# Patient Record
Sex: FEMALE | Race: WHITE | NOT HISPANIC OR LATINO | Employment: OTHER | URBAN - METROPOLITAN AREA
[De-identification: names, ages, dates, MRNs, and addresses within clinical notes are randomized per-mention and may not be internally consistent; named-entity substitution may affect disease eponyms.]

---

## 2018-02-05 ENCOUNTER — HOSPITAL ENCOUNTER (OUTPATIENT)
Dept: MAMMOGRAPHY | Facility: CLINIC | Age: 68
Discharge: HOME | End: 2018-02-05
Attending: INTERNAL MEDICINE

## 2018-02-05 DIAGNOSIS — Z12.31 VISIT FOR SCREENING MAMMOGRAM: ICD-10-CM

## 2018-06-28 ENCOUNTER — RECORDS - HEALTHEAST (OUTPATIENT)
Dept: ADMINISTRATIVE | Facility: OTHER | Age: 68
End: 2018-06-28

## 2019-04-22 ENCOUNTER — COMMUNICATION - HEALTHEAST (OUTPATIENT)
Dept: INTERNAL MEDICINE | Facility: CLINIC | Age: 69
End: 2019-04-22

## 2019-05-29 ENCOUNTER — HOSPITAL ENCOUNTER (OUTPATIENT)
Dept: MAMMOGRAPHY | Facility: CLINIC | Age: 69
Discharge: HOME | End: 2019-05-29
Attending: INTERNAL MEDICINE

## 2019-05-29 DIAGNOSIS — Z12.31 VISIT FOR SCREENING MAMMOGRAM: ICD-10-CM

## 2019-09-09 ENCOUNTER — RECORDS - HEALTHEAST (OUTPATIENT)
Dept: ADMINISTRATIVE | Facility: OTHER | Age: 69
End: 2019-09-09

## 2019-09-10 ENCOUNTER — RECORDS - HEALTHEAST (OUTPATIENT)
Dept: ADMINISTRATIVE | Facility: OTHER | Age: 69
End: 2019-09-10

## 2020-04-30 ENCOUNTER — OFFICE VISIT - HEALTHEAST (OUTPATIENT)
Dept: INTERNAL MEDICINE | Facility: CLINIC | Age: 70
End: 2020-04-30

## 2020-04-30 DIAGNOSIS — Z90.5 SOLITARY KIDNEY, ACQUIRED: ICD-10-CM

## 2020-04-30 DIAGNOSIS — M85.89 OSTEOPENIA OF MULTIPLE SITES: ICD-10-CM

## 2020-04-30 DIAGNOSIS — N28.9 RENAL INSUFFICIENCY: ICD-10-CM

## 2020-04-30 DIAGNOSIS — Z80.3 FAMILY HISTORY OF MALIGNANT NEOPLASM OF BREAST: ICD-10-CM

## 2020-04-30 DIAGNOSIS — R73.03 PREDIABETES: ICD-10-CM

## 2020-04-30 RX ORDER — LORATADINE 10 MG/1
10 TABLET ORAL DAILY PRN
Status: SHIPPED | COMMUNITY
Start: 2020-04-30

## 2020-04-30 ASSESSMENT — PATIENT HEALTH QUESTIONNAIRE - PHQ9: SUM OF ALL RESPONSES TO PHQ QUESTIONS 1-9: 0

## 2020-06-15 ENCOUNTER — RECORDS - HEALTHEAST (OUTPATIENT)
Dept: ADMINISTRATIVE | Facility: OTHER | Age: 70
End: 2020-06-15

## 2020-07-16 ENCOUNTER — RECORDS - HEALTHEAST (OUTPATIENT)
Dept: ADMINISTRATIVE | Facility: OTHER | Age: 70
End: 2020-07-16

## 2020-07-22 ENCOUNTER — HOSPITAL ENCOUNTER (OUTPATIENT)
Dept: MAMMOGRAPHY | Facility: CLINIC | Age: 70
Discharge: HOME | End: 2020-07-22
Attending: INTERNAL MEDICINE

## 2020-07-22 DIAGNOSIS — Z12.31 VISIT FOR SCREENING MAMMOGRAM: ICD-10-CM

## 2020-09-16 ENCOUNTER — OFFICE VISIT - HEALTHEAST (OUTPATIENT)
Dept: INTERNAL MEDICINE | Facility: CLINIC | Age: 70
End: 2020-09-16

## 2020-09-16 ENCOUNTER — COMMUNICATION - HEALTHEAST (OUTPATIENT)
Dept: INTERNAL MEDICINE | Facility: CLINIC | Age: 70
End: 2020-09-16

## 2020-09-16 DIAGNOSIS — M85.89 OSTEOPENIA OF MULTIPLE SITES: ICD-10-CM

## 2020-09-16 DIAGNOSIS — Z00.00 ROUTINE GENERAL MEDICAL EXAMINATION AT A HEALTH CARE FACILITY: ICD-10-CM

## 2020-09-16 DIAGNOSIS — R73.03 PREDIABETES: ICD-10-CM

## 2020-09-16 DIAGNOSIS — N28.9 RENAL INSUFFICIENCY: ICD-10-CM

## 2020-09-16 DIAGNOSIS — Z23 NEED FOR IMMUNIZATION AGAINST INFLUENZA: ICD-10-CM

## 2020-09-16 DIAGNOSIS — Z23 NEED FOR VACCINATION FOR PNEUMOCOCCUS: ICD-10-CM

## 2020-09-16 DIAGNOSIS — Z80.3 FAMILY HISTORY OF MALIGNANT NEOPLASM OF BREAST: ICD-10-CM

## 2020-09-16 DIAGNOSIS — R93.89 ABNORMAL FINDINGS ON DIAGNOSTIC IMAGING OF OTHER SPECIFIED BODY STRUCTURES: ICD-10-CM

## 2020-09-16 DIAGNOSIS — Z90.5 SOLITARY KIDNEY, ACQUIRED: ICD-10-CM

## 2020-09-16 LAB
ALBUMIN SERPL-MCNC: 4.4 G/DL (ref 3.5–5)
ALP SERPL-CCNC: 67 U/L (ref 45–120)
ALT SERPL W P-5'-P-CCNC: 22 U/L (ref 0–45)
ANION GAP SERPL CALCULATED.3IONS-SCNC: 9 MMOL/L (ref 5–18)
AST SERPL W P-5'-P-CCNC: 23 U/L (ref 0–40)
BILIRUB SERPL-MCNC: 0.7 MG/DL (ref 0–1)
BUN SERPL-MCNC: 17 MG/DL (ref 8–22)
CALCIUM SERPL-MCNC: 10.4 MG/DL (ref 8.5–10.5)
CHLORIDE BLD-SCNC: 108 MMOL/L (ref 98–107)
CHOLEST SERPL-MCNC: 234 MG/DL
CO2 SERPL-SCNC: 27 MMOL/L (ref 22–31)
CREAT SERPL-MCNC: 1.03 MG/DL (ref 0.6–1.1)
ERYTHROCYTE [DISTWIDTH] IN BLOOD BY AUTOMATED COUNT: 12.1 % (ref 11–14.5)
FASTING STATUS PATIENT QL REPORTED: YES
GFR SERPL CREATININE-BSD FRML MDRD: 53 ML/MIN/1.73M2
GLUCOSE BLD-MCNC: 93 MG/DL (ref 70–125)
HBA1C MFR BLD: 5.7 %
HCT VFR BLD AUTO: 44.3 % (ref 35–47)
HDLC SERPL-MCNC: 69 MG/DL
HGB BLD-MCNC: 14.6 G/DL (ref 12–16)
LDLC SERPL CALC-MCNC: 150 MG/DL
MCH RBC QN AUTO: 30.3 PG (ref 27–34)
MCHC RBC AUTO-ENTMCNC: 33 G/DL (ref 32–36)
MCV RBC AUTO: 92 FL (ref 80–100)
PLATELET # BLD AUTO: 147 THOU/UL (ref 140–440)
PMV BLD AUTO: 8.7 FL (ref 7–10)
POTASSIUM BLD-SCNC: 4.5 MMOL/L (ref 3.5–5)
PROT SERPL-MCNC: 7.3 G/DL (ref 6–8)
RBC # BLD AUTO: 4.83 MILL/UL (ref 3.8–5.4)
SODIUM SERPL-SCNC: 144 MMOL/L (ref 136–145)
TRIGL SERPL-MCNC: 73 MG/DL
TSH SERPL DL<=0.005 MIU/L-ACNC: 2.24 UIU/ML (ref 0.3–5)
WBC: 5.1 THOU/UL (ref 4–11)

## 2020-09-16 ASSESSMENT — PATIENT HEALTH QUESTIONNAIRE - PHQ9: SUM OF ALL RESPONSES TO PHQ QUESTIONS 1-9: 0

## 2020-09-16 ASSESSMENT — MIFFLIN-ST. JEOR: SCORE: 1125.64

## 2020-09-28 ENCOUNTER — COMMUNICATION - HEALTHEAST (OUTPATIENT)
Dept: INTERNAL MEDICINE | Facility: CLINIC | Age: 70
End: 2020-09-28

## 2020-09-28 DIAGNOSIS — Z20.822 ENCOUNTER FOR LABORATORY TESTING FOR COVID-19 VIRUS: ICD-10-CM

## 2020-09-29 ENCOUNTER — RECORDS - HEALTHEAST (OUTPATIENT)
Dept: BONE DENSITY | Facility: CLINIC | Age: 70
End: 2020-09-29

## 2020-09-29 ENCOUNTER — COMMUNICATION - HEALTHEAST (OUTPATIENT)
Dept: INTERNAL MEDICINE | Facility: CLINIC | Age: 70
End: 2020-09-29

## 2020-09-29 ENCOUNTER — RECORDS - HEALTHEAST (OUTPATIENT)
Dept: ADMINISTRATIVE | Facility: OTHER | Age: 70
End: 2020-09-29

## 2020-09-29 DIAGNOSIS — M85.89 OTHER SPECIFIED DISORDERS OF BONE DENSITY AND STRUCTURE, MULTIPLE SITES: ICD-10-CM

## 2020-09-29 DIAGNOSIS — Z20.822 ENCOUNTER FOR LABORATORY TESTING FOR COVID-19 VIRUS: ICD-10-CM

## 2020-10-07 ENCOUNTER — COMMUNICATION - HEALTHEAST (OUTPATIENT)
Dept: INTERNAL MEDICINE | Facility: CLINIC | Age: 70
End: 2020-10-07

## 2020-10-07 ENCOUNTER — RECORDS - HEALTHEAST (OUTPATIENT)
Dept: ADMINISTRATIVE | Facility: OTHER | Age: 70
End: 2020-10-07

## 2021-04-22 ENCOUNTER — OFFICE VISIT - HEALTHEAST (OUTPATIENT)
Dept: INTERNAL MEDICINE | Facility: CLINIC | Age: 71
End: 2021-04-22

## 2021-04-22 DIAGNOSIS — Z90.5 SOLITARY KIDNEY, ACQUIRED: ICD-10-CM

## 2021-04-22 DIAGNOSIS — N28.9 RENAL INSUFFICIENCY: ICD-10-CM

## 2021-04-22 DIAGNOSIS — M85.89 OSTEOPENIA OF MULTIPLE SITES: ICD-10-CM

## 2021-04-22 DIAGNOSIS — R73.03 PREDIABETES: ICD-10-CM

## 2021-04-22 RX ORDER — MELATONIN 10 MG
1 CAPSULE ORAL
Status: ON HOLD | COMMUNITY
Start: 2021-04-22 | End: 2022-12-28

## 2021-05-18 ENCOUNTER — RECORDS - HEALTHEAST (OUTPATIENT)
Dept: ADMINISTRATIVE | Facility: OTHER | Age: 71
End: 2021-05-18

## 2021-05-22 ENCOUNTER — HEALTH MAINTENANCE LETTER (OUTPATIENT)
Age: 71
End: 2021-05-22

## 2021-05-26 ENCOUNTER — RECORDS - HEALTHEAST (OUTPATIENT)
Dept: ADMINISTRATIVE | Facility: CLINIC | Age: 71
End: 2021-05-26

## 2021-05-27 ENCOUNTER — RECORDS - HEALTHEAST (OUTPATIENT)
Dept: ADMINISTRATIVE | Facility: CLINIC | Age: 71
End: 2021-05-27

## 2021-05-27 ASSESSMENT — PATIENT HEALTH QUESTIONNAIRE - PHQ9
SUM OF ALL RESPONSES TO PHQ QUESTIONS 1-9: 0
SUM OF ALL RESPONSES TO PHQ QUESTIONS 1-9: 0

## 2021-05-28 ENCOUNTER — RECORDS - HEALTHEAST (OUTPATIENT)
Dept: ADMINISTRATIVE | Facility: CLINIC | Age: 71
End: 2021-05-28

## 2021-05-29 ENCOUNTER — RECORDS - HEALTHEAST (OUTPATIENT)
Dept: ADMINISTRATIVE | Facility: CLINIC | Age: 71
End: 2021-05-29

## 2021-05-30 ENCOUNTER — RECORDS - HEALTHEAST (OUTPATIENT)
Dept: ADMINISTRATIVE | Facility: CLINIC | Age: 71
End: 2021-05-30

## 2021-06-02 ENCOUNTER — RECORDS - HEALTHEAST (OUTPATIENT)
Dept: ADMINISTRATIVE | Facility: CLINIC | Age: 71
End: 2021-06-02

## 2021-06-05 VITALS
HEIGHT: 65 IN | WEIGHT: 134.2 LBS | TEMPERATURE: 97.8 F | HEART RATE: 68 BPM | DIASTOLIC BLOOD PRESSURE: 82 MMHG | OXYGEN SATURATION: 97 % | BODY MASS INDEX: 22.36 KG/M2 | SYSTOLIC BLOOD PRESSURE: 120 MMHG

## 2021-06-07 NOTE — PATIENT INSTRUCTIONS - HE
Generally quite healthy, active 70-year-old woman establishing primary care with me.  Issues are    Solitary right kidney since age 46 (she was a living related kidney donor), mild renal insufficiency with creatinine 1.18 measured September 2018, historically good blood pressures    Mildly elevated LDL cholesterol of 135, but has generous levels of HDL cholesterol at 74; I do not consider as having lipid disorder    Borderline elevated hemoglobin A1c of 5.7, possibly indicating prediabetes, when checked at [facility] in 2018.  When she comes in for face-to-face visit this autumn, lets include A1c with her lab work.    Osteopenia by DEXA scan done at [facility] September 19, 2018 with total lumbar T score -1.0 and total hip T score -1.1.  Was on Fosamax for about 2 years about 20 years ago (around age 51).  I recommended that this autumn 2020, when I meet her face-to-face, that would be a good time to get another bone mineral density measurement.  In the meantime, continue on the calcium and vitamin D supplements that she already takes.  She already gets good weightbearing exercise.    Family history of breast cancer (sister), most recent mammogram with tomosynthesis was done May 29, 2019, so she should get her next one done sometime in the month of May or June 2020.    Satisfactory screening colonoscopy April 2016, good for 10 years, could be rechecked in 2026.    Seasonal nasal allergies, for which he takes over-the-counter loratadine.

## 2021-06-07 NOTE — PROGRESS NOTES
"Caitlin Mohan is a 70 y.o. female who is being evaluated via a billable video visit.      The patient has been notified of following:     \"This video visit will be conducted via a call between you and your physician/provider. We have found that certain health care needs can be provided without the need for an in-person physical exam.  This service lets us provide the care you need with a video conversation.  If a prescription is necessary we can send it directly to your pharmacy.  If lab work is needed we can place an order for that and you can then stop by our lab to have the test done at a later time.    Video visits are billed at different rates depending on your insurance coverage. Please reach out to your insurance provider with any questions.    If during the course of the call the physician/provider feels a video visit is not appropriate, you will not be charged for this service.\"    Patient has given verbal consent to a Video visit? Yes    Patient would like to receive their AVS by AVS Preference: Quinton.    Patient would like the video invitation sent by: Send to e-mail at: [email or web address]    Will anyone else be joining your video visit? No        Video Start Time: 9:37    Video visit today to establish primary care with me.  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  Historically she is been very healthy.  She told me that she generally feels well, and is on no prescription medication.    She goes for daily walks with her  around the neighborhood, while they take precautions against COVID-19.    At age 46 donated her left kidney to sister (who had DM 1), Also half pancreas    Executive Health Exam [place] [facility]-    [place]  Calculated LDL   mg/dL  135High       Cholesterol, HDL, S  >=50 mg/dL  74      Hemoglobin A1c, B  4.0 - 5.6 %  5.7High        9/19/18 0711     Creatinine, S  0.59 - 1.04 mg/dL  1.18High       Sept 19, 2018  Femur Neck: BMD =  0.864 g/cm (sq)      T-score = " -1.3      Z-score =  0.3        Total Hip: BMD =  0.873 g/cm (sq)      T-score = -1.1      Z-score =  0.3    Lumbar Spine  [single scan]:      L1: BMD = 1.027 g/cm (sq), T-score =-0.9, Z-score = 0.7      L2: BMD = 1.026 g/cm (sq), T-score =-1.5, Z-score = 0.1      L3: BMD = 1.120 g/cm (sq), T-score =-0.8, Z-score = 0.9      L4: BMD = 1.099 g/cm (sq), T-score =-0.9, Z-score = 0.7        Total Lumbar Spine: BMD =  1.071 g/cm (sq)      T-score = -1.0      Z-score =  0.6  IMPRESSION: Osteopenia   Was on Fosamax about 20 yrs ago for about 2 years    Weight: 132#  Height 5 foot 5    BP Readings from Last 3 Encounters:   11/25/15 124/66     Family history breast CA  Get tomosynthesis  BILATERAL FULL FIELD DIGITAL SCREENING MAMMOGRAM WITH TOMOSYNTHESIS     Performed on: 5/29/19.     Colonoscopy: age 66, 4-, normal 10 years    During today's video interview, she appeared well, breathing comfortably, speech fluent, facial movement symmetrical.  Her movements around the room looked crisp and well balanced.        ASSESSMENT AND PLAN    Generally quite healthy, active 70-year-old woman establishing primary care with me.  Issues are    Solitary right kidney since age 46 (she was a living related kidney donor), mild renal insufficiency with creatinine 1.18 measured September 2018, historically good blood pressures    Mildly elevated LDL cholesterol of 135, but has generous levels of HDL cholesterol at 74; I do not consider as having lipid disorder    Borderline elevated hemoglobin A1c of 5.7, possibly indicating prediabetes, when checked at [facility] in 2018.  When she comes in for face-to-face visit this autumn, lets include A1c with her lab work.    Osteopenia by DEXA scan done at [facility] September 19, 2018 with total lumbar T score -1.0 and total hip T score -1.1.  Was on Fosamax for about 2 years about 20 years ago (around age 51).  I recommended that this autumn 2020, when I meet her face-to-face, that would be  a good time to get another bone mineral density measurement.  In the meantime, continue on the calcium and vitamin D supplements that she already takes.  She already gets good weightbearing exercise.    Family history of breast cancer (sister), most recent mammogram with tomosynthesis was done May 29, 2019, so she should get her next one done sometime in the month of May or June 2020.    Satisfactory screening colonoscopy April 2016, good for 10 years, could be rechecked in 2026.    Seasonal nasal allergies, for which he takes over-the-counter loratadine.      Video-Visit Details    Type of service:  Video Visit    Video End Time (time video stopped): 9:51 AM  Originating Location (pt. Location): Home    Distant Location (provider location):  [facility] INTERNAL MEDICINE     Platform used for Video Visit: Haileo    I spent 15 minutes video time with the patient, with > 50% counseling, explaining and discussing with the patient the issues enumerated in the Assessment and Plan section of this note and answering questions. Afterwards, the patient was given a printout of the AVS,  [employer]      Juan Khanna MD

## 2021-06-11 NOTE — PROGRESS NOTES
Assessment and Plan:     Annual wellness visit for Ms. Campbell who is 70 years old this year, and has been doing well since our last visit in April 2020, but then discovered she had fractured her left ankle when she sought medical attention in June 2020.    Broke left ankle April 17, 2020 on a walk, stepped off trail, rolled ankle  Saw medical attention 2 months later, xray confirmed fracture, which was healing, no boot, no case.  On physical exam today, the ankle is not swollen, but there is a soft tissue lump about 1 cm on the medial aspect of the ankle that I think is a ganglion cyst arising from tendon.  I do not think he has anything to do with the fracture.  She seems to have recovered pretty well.  I reminded her of the importance of supportive footwear when she is hiking in the great outdoors.    Solitary right kidney since age 46 (she was a living related kidney donor), mild renal insufficiency with creatinine 1.18 measured September 2018, historically good blood pressures  Will check comprehensive metabolic panel today September 16 to measure her kidney function markers BUN and creatinine     Mildly elevated LDL cholesterol of 135, but has generous levels of HDL cholesterol at 74; I do not consider as having lipid disorder  Check lipid panel today September 16     Borderline elevated hemoglobin A1c of 5.7, possibly indicating prediabetes, when checked at [facility] in 2018.    Check A1c today September 16    Osteopenia by DEXA scan done at [facility] September 19, 2018 with total lumbar T score -1.0 and total hip T score -1.1.  Was on Fosamax for about 2 years about 20 years ago (around age 51).   Will get DEXA  Continue on the calcium and vitamin D supplements that she already takes.  She already gets good weightbearing exercise.     Family history of breast cancer (sister)  BILATERAL FULL FIELD DIGITAL SCREENING MAMMOGRAM WITH TOMOSYNTHESIS  Performed on: 7/22/20.     Satisfactory screening  colonoscopy April 2016, good for 10 years, could be rechecked in 2026.    Seasonal nasal allergies, for which he takes over-the-counter loratadine.    Loose stool once a day, likely represents irritable bowel syndrome.  She told me that she will have a large loose sometimes liquid bowel movement soon after eating breakfast.  But during the rest of the day she feels okay.  She told me that she does consume a lot of coffee, and her colon may be sensitive to the caffeine.  Might be worth an experiment to go decaffeinated to see if it makes a difference.    Today we will administer seasonal influenza vaccine double strength, also pneumococcal polysaccharide 23.  She has received the live shingles vaccine, but I told her she could consider getting the recombinant shingles vaccine, called Shingrix, which is 2 injections 2 or 3 months apart, and would be administered at a pharmacy under her Medicare prescription drug benefit.    The patient's current medical problems were reviewed.    I have had an Advance Directives discussion with the patient.  The following health maintenance schedule was reviewed with the patient and provided in printed form in the after visit summary:   Health Maintenance   Topic Date Due     HEPATITIS C SCREENING  1950     ZOSTER VACCINES (2 of 3) 02/03/2014     PNEUMOCOCCAL IMMUNIZATION 65+ LOW/MEDIUM RISK (1 of 2 - PCV13) 02/04/2016     DXA SCAN  02/04/2016     MEDICARE ANNUAL WELLNESS VISIT  11/25/2016     INFLUENZA VACCINE RULE BASED (1) 08/01/2020     ADVANCE CARE PLANNING  11/25/2020     LIPID  11/25/2020     FALL RISK ASSESSMENT  04/30/2021     MAMMOGRAM  07/22/2022     TD 18+ HE  04/12/2023     COLORECTAL CANCER SCREENING  04/13/2026     HEPATITIS B VACCINES  Aged Out        Subjective:   Chief Complaint: Caitlin Mohan is an 70 y.o. female here for an Annual Wellness visit.   HPI:     Annual wellness visit for Ms. Campbell who is 70 years old this year, and has been doing well  since our last visit in April 2020, but then discovered she had fractured her left ankle when she sought medical attention in June 2020.    Broke left ankle April 17, 2020 on a walk, stepped off trail, rolled ankle  Saw medical attention 2 months later, xray confirmed fracture, which was healing, no boot, no case.  On physical exam today, the ankle is not swollen, but there is a soft tissue lump about 1 cm on the medial aspect of the ankle that I think is a ganglion cyst arising from tendon.  I do not think he has anything to do with the fracture.  She seems to have recovered pretty well.  I reminded her of the importance of supportive footwear when she is hiking in the great outdoors.        Immunization History   Administered Date(s) Administered     Influenza high dose,seasonal,PF, 65+ yrs 11/19/2019     Influenza, inj, historic,unspecified 11/19/2019     Pneumo Conj 13-V (2010&after) 09/28/2018     Tdap 04/12/2013     ZOSTER, LIVE 12/09/2013       Review of Systems:Please see above.  The rest of the review of systems are negative for all systems.    Patient Care Team:  Juan Khanna MD as PCP - General (Internal Medicine)     Patient Active Problem List   Diagnosis     Osteopenia of multiple sites     Solitary kidney, acquired     Renal insufficiency     Family history of malignant neoplasm of breast (sister)     Prediabetes     Past Medical History:   Diagnosis Date     Breast cyst 1978     H/O kidney donation 1996    LEFT     History of basal cell cancer     NARES     Osteopenia     -1.1     Postmenopausal     AGE 51      Past Surgical History:   Procedure Laterality Date     APPENDECTOMY       ARTHROSCOPIC REPAIR ACL       CATARACT EXTRACTION, BILATERAL       KNEE ARTHROSCOPY  1978     KNEE ARTHROSCOPY       LAPAROSCOPIC DONOR NEPHRECTOMY      Description: Pancreatic Transplantation Donor Pancreatectomy;  Recorded: 04/04/2008;      Family History   Problem Relation Age of Onset     Breast cancer Sister  47     Brain cancer Nephew      Diabetes Other         FAMILY     Cancer Sister         BREAST     Cancer Sister      Diabetes Sister       Social History     Socioeconomic History     Marital status:      Spouse name: Not on file     Number of children: Not on file     Years of education: Not on file     Highest education level: Not on file   Occupational History     Not on file   Social Needs     Financial resource strain: Not on file     Food insecurity     Worry: Not on file     Inability: Not on file     Transportation needs     Medical: Not on file     Non-medical: Not on file   Tobacco Use     Smoking status: Never Smoker     Smokeless tobacco: Never Used   Substance and Sexual Activity     Alcohol use: Yes     Comment: BEER     Drug use: Not on file     Sexual activity: Not on file   Lifestyle     Physical activity     Days per week: Not on file     Minutes per session: Not on file     Stress: Not on file   Relationships     Social connections     Talks on phone: Not on file     Gets together: Not on file     Attends [employer] service: Not on file     Active member of club or organization: Not on file     Attends meetings of clubs or organizations: Not on file     Relationship status: Not on file     Intimate partner violence     Fear of current or ex partner: Not on file     Emotionally abused: Not on file     Physically abused: Not on file     Forced sexual activity: Not on file   Other Topics Concern     Not on file   Social History Narrative    APOLONIA MARIO    [employer] [address] [place] [address]-[facility]                  Current Outpatient Medications   Medication Sig Dispense Refill     aspirin 81 MG EC tablet Take 81 mg by mouth daily.       calcium carbonate (OS-MAGDALENA) 600 mg (1,500 mg) tablet Take 600 mg by mouth daily.       cholecalciferol, vitamin D3, 1,000 unit tablet Take 1,000 Units by  "mouth daily.       loratadine (CLARITIN) 10 mg tablet Take 10 mg by mouth daily.       lysine 500 mg Tab Take 500 mg by mouth daily.       omega 3-dha-epa-fish oil 500-1,000 mg cap Take 1,000 mg by mouth.       No current facility-administered medications for this visit.       Objective:   Vital Signs:   Visit Vitals  /82 (Patient Site: Left Arm, Patient Position: Sitting, Cuff Size: Adult Regular)   Pulse 68   Temp 97.8  F (36.6  C) (Oral)   Ht 5' 4.75\" (1.645 m)   Wt 134 lb 3.2 oz (60.9 kg)   SpO2 97%   BMI 22.50 kg/m           VisionScreening:  No exam data present     PHYSICAL EXAM  General: Alert, in no distress  Skin: No significant lesion seen.  Eyes/nose/throat: Eyes without scleral icterus, eye movements normal, pupils equal and reactive, oropharynx clear, ears with normal TM's  MSK: Neck with good ROM  Lymphatic: Neck without adenopathy or masses  Endocrine: Thyroid with no nodules to palpation  Pulm: Lungs clear to auscultation bilaterally  Cardiac: Heart with regular rate and rhythm, no murmur or gallop  GI: Abdomen soft, nontender. No palpable enlargement of liver or spleen  MSK: Extremities no tenderness or edema  + Medial aspect left ankle, superior to the medial malleolus there is a soft tissue lump about 1 cm that has the consistency of a ganglion cyst  Neuro: Moves all extremities, without focal weakness  Psych: Alert, normal mental status. Normal affect and speech      Assessment Results 9/16/2020   Activities of Daily Living No help needed   Instrumental Activities of Daily Living No help needed   Mini Cog Total Score 5   Some recent data might be hidden     A Mini-Cog score of 0-2 suggests the possibility of dementia, score of 3-5 suggests no dementia      Identified Health Risks:     She is at risk for lack of exercise and has been provided with information to increase physical activity for the benefit of her well-being.  The patient reports that she drinks more than one alcoholic drink " per day but denies binge or excessive drinking. She was counseled and given information about possible harmful effects of excessive alcohol intake.  The patient was provided with written information regarding signs of hearing loss.  Information on urinary incontinence and treatment options given to patient.  She is at risk for falling and has been provided with information to reduce the risk of falling at home.  Patient's advanced directive was discussed and I am comfortable with the patient's wishes.

## 2021-06-11 NOTE — TELEPHONE ENCOUNTER
Patient is requesting COVID test for trip. Order pended.  Morena Prieto CMA ............... 12:52 PM, 09/29/20

## 2021-06-16 PROBLEM — Z80.3 FAMILY HISTORY OF MALIGNANT NEOPLASM OF BREAST: Status: ACTIVE | Noted: 2020-04-30

## 2021-06-16 PROBLEM — M85.89 OSTEOPENIA OF MULTIPLE SITES: Status: ACTIVE | Noted: 2020-04-30

## 2021-06-16 PROBLEM — N28.9 RENAL INSUFFICIENCY: Status: ACTIVE | Noted: 2020-04-30

## 2021-06-16 PROBLEM — Z90.5 SOLITARY KIDNEY, ACQUIRED: Status: ACTIVE | Noted: 2020-04-30

## 2021-06-16 PROBLEM — R73.03 PREDIABETES: Status: ACTIVE | Noted: 2020-04-30

## 2021-06-16 NOTE — PATIENT INSTRUCTIONS - HE
Follow-up for multiple issues, overall doing just great, return from [place] April 17, 2020, where she received her Covid vaccination.    Right biceps area discomfort, which I think could be coming from her shoulder, possibly rotator cuff syndrome  Range of motion of her right shoulder still well-preserved.  She is noticed a little anterior shoulder pain as well, so I asked her to do range of motion exercises.     Broke left ankle April 17, 2020 on a walk, stepped off trail, rolled ankle  She had good recovery, and can now walk as far she wants.    Solitary right kidney since age 46 (she was a living related kidney donor to sister), mild renal insufficiency  Estimated GFR 53  Lab Results   Component Value Date    CREATININE 1.03 09/16/2020    BUN 17 09/16/2020     09/16/2020    K 4.5 09/16/2020     (H) 09/16/2020    CO2 27 09/16/2020     Mildly elevated LDL cholesterol of 135  Lipid profile did get a little worse in September 2020, with the LDL rising up to 150, and the HDL dropped a bit to 69.  When she comes for her annual wellness visit later this year, we can check a lipid panel.  Lab Results   Component Value Date    CHOL 234 (H) 09/16/2020    CHOL 229 (H) 11/25/2015    CHOL 196 04/22/2011     Lab Results   Component Value Date    HDL 69 09/16/2020    HDL 81 11/25/2015    HDL 64 04/22/2011     Lab Results   Component Value Date    LDLCALC 150 (H) 09/16/2020    LDLCALC 137 (H) 11/25/2015    LDLCALC 120 04/22/2011     Lab Results   Component Value Date    TRIG 73 09/16/2020    TRIG 57 11/25/2015    TRIG 61 04/22/2011     No components found for: CHOLHDL    Borderline elevated hemoglobin A1c of 5.7, possibly indicating prediabetes, when checked [facility].    She did donate about half of her pancreas to her sister who had type 1 diabetes.  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    But I told her A1c was 5.7 is really not too bad, she  should continue to eat judiciously, being careful about the carbohydrate and overall calories, and get plenty of exercise.    Lab Results   Component Value Date    HGBA1C 5.7 (H) 09/16/2020     Osteopenia by DEXA scan done at [facility] September 19, 2018 with total lumbar T score -1.0 and total hip T score -1.1.  Was on Fosamax for about 2 years about 20 years ago (around age 51).     Continue on the calcium and vitamin D supplements that she already takes.  She already gets good weightbearing exercise.     DEXA 9-  1. The spine bone density L1-L4 with T-score -0.9, with no statistically significant change compared to the previous DXA scan done in 2009.  2. Femoral bone densities show left femoral neck T- score -1.5 and right femoral neck T-score -1.4 and significant decline of 5.0% on the left hip compared to 2009.  3. Trabecular bone score indicates moderate trabecular bone architecture.   70 y.o. female with LOW BONE DENSITY (OSTEOPENIA) and MODERATE fracture risk, adjusted for the TBS, with major osteoporotic fracture risk 14.9% and hip fracture risk 2.8%.      Family history of breast cancer (sister)  BILATERAL FULL FIELD DIGITAL SCREENING MAMMOGRAM WITH TOMOSYNTHESIS  Performed on: 7/22/20.     Satisfactory screening colonoscopy April 2016, good for 10 years, could be rechecked in 2026.     Seasonal nasal allergies, for which he takes over-the-counter loratadine.     Loose stool once a day, could be irritable bowel syndrome, consider mild exocrine pancreatic insufficiency.    Not too bothersome these days as of April 2021, consider that she might have a mild degree of pancreatic exocrine insufficiency after she donated have her pancreas.    She received her second dose of Pfizer COVID-19 vaccine for per 17 2021, could consider getting recombinant shingles vaccine later this year.  She is received both of her pneumococcal vaccines already.

## 2021-06-16 NOTE — PROGRESS NOTES
Office Visit - Follow Up   Caitlin Mohan   71 y.o. female    Date of Visit: 4/22/2021    Chief Complaint   Patient presents with     Follow-up     Fasting 6 month checkup        -------------------------------------------------------------------------------------------------------------------------  Assessment and Plan      Follow-up for multiple issues, overall doing just great, return from [place] April 17, 2020, where she received her Covid vaccination.    Right biceps area discomfort, which I think could be coming from her shoulder, possibly rotator cuff syndrome  Range of motion of her right shoulder still well-preserved.  She is noticed a little anterior shoulder pain as well, so I asked her to do range of motion exercises.     Broke left ankle April 17, 2020 on a walk, stepped off trail, rolled ankle  She had good recovery, and can now walk as far she wants.    Solitary right kidney since age 46 (she was a living related kidney donor to sister), mild renal insufficiency  Estimated GFR 53  Lab Results   Component Value Date    CREATININE 1.03 09/16/2020    BUN 17 09/16/2020     09/16/2020    K 4.5 09/16/2020     (H) 09/16/2020    CO2 27 09/16/2020     Mildly elevated LDL cholesterol of 135  Lipid profile did get a little worse in September 2020, with the LDL rising up to 150, and the HDL dropped a bit to 69.  When she comes for her annual wellness visit later this year, we can check a lipid panel.  Lab Results   Component Value Date    CHOL 234 (H) 09/16/2020    CHOL 229 (H) 11/25/2015    CHOL 196 04/22/2011     Lab Results   Component Value Date    HDL 69 09/16/2020    HDL 81 11/25/2015    HDL 64 04/22/2011     Lab Results   Component Value Date    LDLCALC 150 (H) 09/16/2020    LDLCALC 137 (H) 11/25/2015    LDLCALC 120 04/22/2011     Lab Results   Component Value Date    TRIG 73 09/16/2020    TRIG 57 11/25/2015    TRIG 61 04/22/2011     No components found for: CHOLHDL    Borderline elevated  hemoglobin A1c of 5.7, possibly indicating prediabetes, when checked at [facility] in 2018.    She did donate about half of her pancreas to her sister who had type 1 diabetes.  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    But I told her A1c was 5.7 is really not too bad, she should continue to eat judiciously, being careful about the carbohydrate and overall calories, and get plenty of exercise.    Lab Results   Component Value Date    HGBA1C 5.7 (H) 09/16/2020     Osteopenia by DEXA scan done at [facility] September 19, 2018 with total lumbar T score -1.0 and total hip T score -1.1.  Was on Fosamax for about 2 years about 20 years ago (around age 51).     Continue on the calcium and vitamin D supplements that she already takes.  She already gets good weightbearing exercise.     DEXA 9-  1. The spine bone density L1-L4 with T-score -0.9, with no statistically significant change compared to the previous DXA scan done in 2009.  2. Femoral bone densities show left femoral neck T- score -1.5 and right femoral neck T-score -1.4 and significant decline of 5.0% on the left hip compared to 2009.  3. Trabecular bone score indicates moderate trabecular bone architecture.   70 y.o. female with LOW BONE DENSITY (OSTEOPENIA) and MODERATE fracture risk, adjusted for the TBS, with major osteoporotic fracture risk 14.9% and hip fracture risk 2.8%.      Family history of breast cancer (sister)  BILATERAL FULL FIELD DIGITAL SCREENING MAMMOGRAM WITH TOMOSYNTHESIS  Performed on: 7/22/20.     Satisfactory screening colonoscopy April 2016, good for 10 years, could be rechecked in 2026.     Seasonal nasal allergies, for which he takes over-the-counter loratadine.     Loose stool once a day, could be irritable bowel syndrome, consider mild exocrine pancreatic insufficiency.    Not too bothersome these days as of April 2021, consider that she might have a mild degree of  pancreatic exocrine insufficiency after she donated have her pancreas.    She received her second dose of Pfizer COVID-19 vaccine for per 17 2021, could consider getting recombinant shingles vaccine later this year.  She is received both of her pneumococcal vaccines already.      Immunization History   Administered Date(s) Administered     COVID-19,PF,Pfizer 01/27/2021, 02/17/2021     Influenza high dose,seasonal,PF, 65+ yrs 11/19/2019     Influenza, inj, historic,unspecified 11/19/2019     Influenza,quad,high Dose,PF, 65yr + 09/16/2020     Pneumo Conj 13-V (2010&after) 09/28/2018     Pneumo Polysac 23-V 09/16/2020     Tdap 04/12/2013     ZOSTER, LIVE 12/09/2013       --------------------------------------------------------------------------------------------------------------------------  History of Present Illness  This 71 y.o. old Follow-up for multiple issues, overall doing just great, return from [place] April 17, 2020, where she received her Covid vaccination.    Right biceps area discomfort, which I think could be coming from her shoulder, possibly rotator cuff syndrome  Range of motion of her right shoulder still well-preserved.  She is noticed a little anterior shoulder pain as well, so I asked her to do range of motion exercises.    Wt Readings from Last 3 Encounters:   04/22/21 138 lb (62.6 kg)   09/16/20 134 lb 3.2 oz (60.9 kg)   11/25/15 141 lb 0.6 oz (64 kg)     BP Readings from Last 3 Encounters:   04/22/21 109/74   09/16/20 120/82   11/25/15 124/66       Lab Results   Component Value Date    WBC 5.1 09/16/2020    HGB 14.6 09/16/2020    HCT 44.3 09/16/2020     09/16/2020    CHOL 234 (H) 09/16/2020    TRIG 73 09/16/2020    HDL 69 09/16/2020    ALT 22 09/16/2020    AST 23 09/16/2020     09/16/2020    K 4.5 09/16/2020     (H) 09/16/2020    CREATININE 1.03 09/16/2020    BUN 17 09/16/2020    CO2 27 09/16/2020    TSH 2.24 09/16/2020    GLUF 103 (H) 06/06/2011    HGBA1C 5.7 (H) 09/16/2020      ---------------------------------------------------------------------------------------------------------------------------    Medications, Allergies, Social, and Problem List   Current Outpatient Medications   Medication Sig Dispense Refill     aspirin 81 MG EC tablet Take 81 mg by mouth daily.       calcium carbonate (OS-MAGDALENA) 600 mg (1,500 mg) tablet Take 600 mg by mouth daily.       cholecalciferol, vitamin D3, 50 mcg (2,000 unit) capsule Take 2,000 Units by mouth daily.        loratadine (CLARITIN) 10 mg tablet Take 10 mg by mouth as needed for allergies.        lysine 500 mg Tab Take 500 mg by mouth daily.       melatonin 10 mg cap Take 1 capsule by mouth at bedtime as needed (sleep).       omega 3-dha-epa-fish oil 500-1,000 mg cap Take 1,000 mg by mouth.       TURMERIC-HERBAL COMPLEX NO.278 ORAL Take 1 tablet by mouth daily.       No current facility-administered medications for this visit.      Allergies   Allergen Reactions     Ampicillin Hives     Codeine Hives     Social History     Tobacco Use     Smoking status: Never Smoker     Smokeless tobacco: Never Used   Substance Use Topics     Alcohol use: Yes     Comment: BEER     Drug use: Not on file     Patient Active Problem List   Diagnosis     Osteopenia of multiple sites     Solitary kidney, acquired     Renal insufficiency     Family history of malignant neoplasm of breast (sister)     Prediabetes        Reviewed, reconciled and updated       Physical Exam   General Appearance:   Looks great    /74   Pulse 70   Temp 97.1  F (36.2  C) (Other) Comment (Src): forehead  Wt 138 lb (62.6 kg)   SpO2 99%   BMI 23.14 kg/m      Blood pressure looks great  Lungs clear  Heart regular rate rhythm  Abdomen is nontender  Extremities no edema  Walks comfortably without ankle pain     Additional Information   I spent 20 minutes on this encounter, including reviewing interval history since last visit, examining the patient, explaining and counseling the  issues enumerated in the Assessment and Plan (patient given a copy)         Juan Khanna MD

## 2021-06-18 NOTE — PATIENT INSTRUCTIONS - HE
Patient Instructions by Morena Prieto CMA at 9/16/2020  9:20 AM     Author: Morena Prieto CMA Service: -- Author Type: Certified Medical Assistant    Filed: 9/16/2020  9:55 AM Encounter Date: 9/16/2020 Status: Addendum    : Juan Khanna MD (Physician)    Related Notes: Original Note by Morena Prieto CMA (Certified Medical Assistant) filed at 9/16/2020  8:56 AM       Annual wellness visit for Ms. Campbell who is 70 years old this year, and has been doing well since our last visit in April 2020, but then discovered she had fractured her left ankle when she sought medical attention in June 2020.    Broke left ankle April 17, 2020 on a walk, stepped off trail, rolled ankle  Saw medical attention 2 months later, xray confirmed fracture, which was healing, no boot, no case.  On physical exam today, the ankle is not swollen, but there is a soft tissue lump about 1 cm on the medial aspect of the ankle that I think is a ganglion cyst arising from tendon.  I do not think he has anything to do with the fracture.  She seems to have recovered pretty well.  I reminded her of the importance of supportive footwear when she is hiking in the great outdoors.    Solitary right kidney since age 46 (she was a living related kidney donor), mild renal insufficiency with creatinine 1.18 measured September 2018, historically good blood pressures  Will check comprehensive metabolic panel today September 16 to measure her kidney function markers BUN and creatinine     Mildly elevated LDL cholesterol of 135, but has generous levels of HDL cholesterol at 74; I do not consider as having lipid disorder  Check lipid panel today September 16     Borderline elevated hemoglobin A1c of 5.7, possibly indicating prediabetes, when checked at [facility] in 2018.    Check A1c today September 16    Osteopenia by DEXA scan done at [facility] September 19, 2018 with total lumbar T score -1.0 and total hip T score -1.1.  Was on  Fosamax for about 2 years about 20 years ago (around age 51).   Will get DEXA  Continue on the calcium and vitamin D supplements that she already takes.  She already gets good weightbearing exercise.     Family history of breast cancer (sister)  BILATERAL FULL FIELD DIGITAL SCREENING MAMMOGRAM WITH TOMOSYNTHESIS  Performed on: 7/22/20.     Satisfactory screening colonoscopy April 2016, good for 10 years, could be rechecked in 2026.    Seasonal nasal allergies, for which he takes over-the-counter loratadine.    Loose stool once a day, likely represents irritable bowel syndrome.  She told me that she will have a large loose sometimes liquid bowel movement soon after eating breakfast.  But during the rest of the day she feels okay.  She told me that she does consume a lot of coffee, and her colon may be sensitive to the caffeine.  Might be worth an experiment to go decaffeinated to see if it makes a difference.    Today we will administer seasonal influenza vaccine double strength, also pneumococcal polysaccharide 23.  She has received the live shingles vaccine, but I told her she could consider getting the recombinant shingles vaccine, called Shingrix, which is 2 injections 2 or 3 months apart, and would be administered at a pharmacy under her Medicare prescription drug benefit.          Patient Education     Exercise for a Healthier Heart  You may wonder how you can improve the health of your heart. If youre thinking about exercise, youre on the right track. You dont need to become an athlete, but you do need a certain amount of brisk exercise to help strengthen your heart. If you have been diagnosed with a heart condition, your doctor may recommend exercise to help stabilize your condition. To help make exercise a habit, choose safe, fun activities.       Be sure to check with your health care provider before starting an exercise program.    Why exercise?  Exercising regularly offers many healthy rewards. It can  help you do all of the following:    Improve your blood cholesterol levels to help prevent further heart trouble    Lower your blood pressure to help prevent a stroke or heart attack    Control diabetes, or reduce your risk of getting this disease    Improve your heart and lung function    Reach and maintain a healthy weight    Make your muscles stronger and more limber so you can stay active    Prevent falls and fractures by slowing the loss of bone mass (osteoporosis)    Manage stress better  Exercise tips  Ease into your routine. Set small goals. Then build on them.  Exercise on most days. Aim for a total of 150 or more minutes of moderate to  vigorous intensity activity each week. Consider 40 minutes, 3 to 4 times a week. For best results, activity should last for 40 minutes on average. It is OK to work up to the 40 minute period over time. Examples of moderate-intensity activity is walking one mile in 15 minutes or 30 to 45 minutes of yard work.  Step up your daily activity level. Along with your exercise program, try being more active throughout the day. Walk instead of drive. Do more household tasks or yard work.  Choose one or more activities you enjoy. Walking is one of the easiest things you can do. You can also try swimming, riding a bike, or taking an exercise class.  Stop exercising and call your doctor if you:    Have chest pain or feel dizzy or lightheaded    Feel burning, tightness, pressure, or heaviness in your chest, neck, shoulders, back, or arms    Have unusual shortness of breath    Have increased joint or muscle pain    Have palpitations or an irregular heartbeat      5275-7115 [employer]. [address] [place] [address], [place] [address]. All rights reserved. This information is not intended as a substitute for professional medical care. Always follow your healthcare professional's instructions.         Patient Education   Alcohol Use   Many people can enjoy a glass of wine or beer  without any negative consequences to their health. According to the Centers for Disease Control and Prevention (CDC), having one or fewer drinks per day for women and two or fewer per day for men is considered moderate drinking.     When people drink more than moderately, it can become concerning. Excessive drinking is defined as consuming 15 drinks or more per week for men and 8 drinks or more per week for women. There are various health problems associated with excessive drinking, which include:    Damage to vital organs like the heart, brain, liver and pancreas    Harm to the digestive tract    Weaken the immune system    Higher risk for heart disease and cancer       Patient Education   Signs of Hearing Loss  Hearing loss is a problem shared by many people. In fact, it is one of the most common health conditions, particularly as people age. Most people over age 65 have some hearing loss, and by age 80, almost everyone does. Because hearing loss usually occurs slowly over the years, you may not realize your hearing ability has gotten worse.       Have your hearing checked  Contact your [facility] care provider if you:    Have to strain to hear normal conversation.    Have to watch other peoples faces very carefully to follow what theyre saying.    Need to ask people to repeat what theyve said.    Often misunderstand what people are saying.    Turn the volume of the television or radio up so high that others complain.    Feel that people are mumbling when theyre talking to you.    Find that the effort to hear leaves you feeling tired and irritated.    Notice, when using the phone, that you hear better with 1 ear than the other.    7627-8544 [employer]. [address] [place] [address], [place] [address]. All rights reserved. This information is not intended as a substitute for professional medical care. Always follow your healthcare professional's instructions.         Patient Education   Urinary Incontinence,  Female (Adult)  Urinary incontinence means loss of control of the bladder. This problem affects many women, especially as they get older. If you have incontinence, you may be embarrassed to ask for help. But know that this problem can be treated.  Types of Incontinence  There are different types of incontinence. Two of the main types are described here. You can have more than one type.    Stress incontinence. With this type, urine leaks when pressure (stress) is put on the bladder. This may happen when you cough, sneeze, or laugh. Stress incontinence most often occurs because the pelvic floor muscles that support the bladder and urethra are weak. This can happen after pregnancy and vaginal childbirth or a hysterectomy. It can also be due to excess body weight or hormone changes.    Urge incontinence (also called overactive bladder). With this type, a sudden urge to urinate is felt often. This may happen even though there may not be much urine in the bladder. The need to urinate often during the night is common. Urge incontinence most often occurs because of bladder spasms. This may be due to bladder irritation or infection. Damage to bladder nerves or pelvic muscles, constipation, and certain medicines can also lead to urge incontinence.  Treatment of urinary incontinence depends on the cause. Further evaluation is needed to find the type you have. This will likely include an exam and certain tests. Based on the results, you and your healthcare provider can then plan treatment. Until a diagnosis is made, the home care tips below can help relieve symptoms.  Home care    Do pelvic floor muscle exercises, if they are prescribed. The pelvic floor muscles help support the bladder and urethra. Many women find that their symptoms improve when doing special exercises that strengthen these muscles. To do the exercises contract the muscles you would use to stop your stream of urine, but do this when youre not urinating. Hold  for 10 seconds, then relax. Repeat 10 to 20 times in a row, at least 3 times a day. Your provider may give you other instructions for how to do the exercises and how often.    Keep a bladder diary. This helps track how often and how much you urinate over a set period of time. Bring this diary with you to your next visit with the provider. The information can help your provider learn more about your bladder problem.    Lose weight, if advised to by your provider. Excess weight puts pressure on the bladder. Your provider can help you create a weight-loss plan thats right for you. This may include exercising more and making certain diet changes.    Don't consume foods and drinks that may irritate the bladder. These can include alcohol and caffeinated drinks.    Quit smoking. Smoking and other tobacco use can lead to chronic cough that strains the pelvic floor muscles. Smoking may also damage the bladder and urethra. Talk with your provider about treatments or methods you can use to quit smoking.    If drinking large amounts of fluid causes you to have symptoms, you may be advised to limit your fluid intake. You may also be advised to drink most of your fluids during the day and to limit fluids at night.    If youre worried about urine leakage or accidents, you may wear absorbent pads to catch urine. Change the pads often. This helps reduce discomfort. It may also reduce the risk of skin or bladder infections.  Follow-up care  Follow up with your healthcare provider, or as directed. It may take some to find the right treatment for your problem. Your treatment plan may include special therapies or medicines. Certain procedures or surgery may also be options. Be sure to discuss any questions you have with your provider.  When to seek medical advice  Call the healthcare provider right away if any of these occur:    Fever of 100.4 F (38 C) or higher, or as directed by your provider    Bladder pain or fullness    Abdominal  swelling    Nausea or vomiting    Back pain    Weakness, dizziness or fainting  Date Last Reviewed: 10/1/2017    8838-4313 [employer]. [address] [place] [address]. All rights reserved. This information is not intended as a substitute for professional medical care. Always follow your healthcare professional's instructions.     Patient Education   Preventing Falls in the Home  As you get older, falls are more likely. Thats because your reaction time slows. Your muscles and joints may also get stiffer, making them less flexible. Illness, medications, and vision changes can also affect your balance. A fall could leave you unable to live on your own. To make your home safer, follow these tips:    Floors    Put nonskid pads under area rugs.    Remove throw rugs.    Replace worn floor coverings.    Tack carpets firmly to each step on carpeted stairs. Put nonskid strips on the edges of uncarpeted stairs.    Keep floors and stairs free of clutter and cords.    Arrange furniture so there are clear pathways.    Clean up any spills right away.    Bathrooms    Install grab bars in the tub or shower.    Apply nonskid strips or put a nonskid rubber mat in the tub or shower.    Sit on a bath chair to bathe.    Use bathmats with nonskid backing.    Lighting    Keep a flashlight in each room.    Put a nightlight along the pathway between the bedroom and the bathroom.    5947-0534 [employer]. [address] [place] [address]. All rights reserved. This information is not intended as a substitute for professional medical care. Always follow your healthcare professional's instructions.           Advance Directive  Patients advance directive was discussed and I am comfortable with the patients wishes.  Patient Education   Personalized Prevention Plan  You are due for the preventive services outlined below.  Your care team is available to assist you in scheduling these services.  If you  have already completed any of these items, please share that information with your care team to update in your medical record.  Health Maintenance   Topic Date Due   ? HEPATITIS C SCREENING  1950   ? ZOSTER VACCINES (2 of 3) 02/03/2014   ? PNEUMOCOCCAL IMMUNIZATION 65+ LOW/MEDIUM RISK (1 of 2 - PCV13) 02/04/2016   ? DXA SCAN  02/04/2016   ? MEDICARE ANNUAL WELLNESS VISIT  11/25/2016   ? INFLUENZA VACCINE RULE BASED (1) 08/01/2020   ? ADVANCE CARE PLANNING  11/25/2020   ? LIPID  11/25/2020   ? FALL RISK ASSESSMENT  04/30/2021   ? MAMMOGRAM  07/22/2022   ? TD 18+ HE  04/12/2023   ? COLORECTAL CANCER SCREENING  04/13/2026   ? HEPATITIS B VACCINES  Aged Out

## 2021-07-01 VITALS
SYSTOLIC BLOOD PRESSURE: 109 MMHG | WEIGHT: 138 LBS | DIASTOLIC BLOOD PRESSURE: 74 MMHG | HEART RATE: 70 BPM | TEMPERATURE: 97.1 F | BODY MASS INDEX: 23.14 KG/M2 | OXYGEN SATURATION: 99 %

## 2021-07-13 ENCOUNTER — RECORDS - HEALTHEAST (OUTPATIENT)
Dept: ADMINISTRATIVE | Facility: CLINIC | Age: 71
End: 2021-07-13

## 2021-07-21 ENCOUNTER — RECORDS - HEALTHEAST (OUTPATIENT)
Dept: ADMINISTRATIVE | Facility: CLINIC | Age: 71
End: 2021-07-21

## 2021-08-13 ENCOUNTER — HOSPITAL ENCOUNTER (OUTPATIENT)
Dept: MAMMOGRAPHY | Facility: CLINIC | Age: 71
Discharge: HOME | End: 2021-08-13
Attending: INTERNAL MEDICINE | Admitting: INTERNAL MEDICINE
Payer: MEDICARE

## 2021-08-13 DIAGNOSIS — Z12.31 VISIT FOR SCREENING MAMMOGRAM: ICD-10-CM

## 2021-08-13 PROCEDURE — 77063 BREAST TOMOSYNTHESIS BI: CPT

## 2021-09-11 ENCOUNTER — HEALTH MAINTENANCE LETTER (OUTPATIENT)
Age: 71
End: 2021-09-11

## 2021-11-06 ENCOUNTER — HEALTH MAINTENANCE LETTER (OUTPATIENT)
Age: 71
End: 2021-11-06

## 2022-05-31 ENCOUNTER — TRANSFERRED RECORDS (OUTPATIENT)
Dept: HEALTH INFORMATION MANAGEMENT | Facility: CLINIC | Age: 72
End: 2022-05-31
Payer: MEDICARE

## 2022-10-30 ENCOUNTER — HEALTH MAINTENANCE LETTER (OUTPATIENT)
Age: 72
End: 2022-10-30

## 2022-12-17 ENCOUNTER — HEALTH MAINTENANCE LETTER (OUTPATIENT)
Age: 72
End: 2022-12-17

## 2022-12-27 RX ORDER — ROSUVASTATIN CALCIUM 10 MG/1
10 TABLET, COATED ORAL AT BEDTIME
COMMUNITY

## 2022-12-28 ENCOUNTER — ANESTHESIA EVENT (OUTPATIENT)
Dept: SURGERY | Facility: CLINIC | Age: 72
End: 2022-12-28
Payer: MEDICARE

## 2022-12-28 ENCOUNTER — HOSPITAL ENCOUNTER (OUTPATIENT)
Facility: CLINIC | Age: 72
Discharge: HOME | End: 2022-12-28
Attending: ORTHOPAEDIC SURGERY | Admitting: ORTHOPAEDIC SURGERY
Payer: MEDICARE

## 2022-12-28 ENCOUNTER — ANESTHESIA (OUTPATIENT)
Dept: SURGERY | Facility: CLINIC | Age: 72
End: 2022-12-28
Payer: MEDICARE

## 2022-12-28 VITALS
WEIGHT: 127.9 LBS | HEIGHT: 64 IN | RESPIRATION RATE: 14 BRPM | SYSTOLIC BLOOD PRESSURE: 110 MMHG | HEART RATE: 73 BPM | BODY MASS INDEX: 21.83 KG/M2 | OXYGEN SATURATION: 99 % | TEMPERATURE: 98.5 F | DIASTOLIC BLOOD PRESSURE: 61 MMHG

## 2022-12-28 DIAGNOSIS — Z96.651 HISTORY OF ARTHROPLASTY OF RIGHT KNEE: Primary | ICD-10-CM

## 2022-12-28 PROCEDURE — 999N000141 HC STATISTIC PRE-PROCEDURE NURSING ASSESSMENT: Performed by: ORTHOPAEDIC SURGERY

## 2022-12-28 PROCEDURE — 250N000009 HC RX 250: Performed by: ORTHOPAEDIC SURGERY

## 2022-12-28 PROCEDURE — 370N000017 HC ANESTHESIA TECHNICAL FEE, PER MIN: Performed by: ORTHOPAEDIC SURGERY

## 2022-12-28 PROCEDURE — 250N000011 HC RX IP 250 OP 636: Performed by: ORTHOPAEDIC SURGERY

## 2022-12-28 PROCEDURE — 360N000074 HC SURGERY LEVEL 1, PER MIN: Performed by: ORTHOPAEDIC SURGERY

## 2022-12-28 PROCEDURE — 258N000003 HC RX IP 258 OP 636: Performed by: ANESTHESIOLOGY

## 2022-12-28 PROCEDURE — 250N000013 HC RX MED GY IP 250 OP 250 PS 637: Performed by: PHYSICIAN ASSISTANT

## 2022-12-28 PROCEDURE — 250N000011 HC RX IP 250 OP 636: Performed by: NURSE ANESTHETIST, CERTIFIED REGISTERED

## 2022-12-28 PROCEDURE — 710N000012 HC RECOVERY PHASE 2, PER MINUTE: Performed by: ORTHOPAEDIC SURGERY

## 2022-12-28 PROCEDURE — 250N000009 HC RX 250: Performed by: NURSE ANESTHETIST, CERTIFIED REGISTERED

## 2022-12-28 RX ORDER — HYDROMORPHONE HCL IN WATER/PF 6 MG/30 ML
0.4 PATIENT CONTROLLED ANALGESIA SYRINGE INTRAVENOUS EVERY 5 MIN PRN
Status: DISCONTINUED | OUTPATIENT
Start: 2022-12-28 | End: 2022-12-28 | Stop reason: HOSPADM

## 2022-12-28 RX ORDER — CYANOCOBALAMIN (VITAMIN B-12) 500 MCG
400 LOZENGE ORAL DAILY
COMMUNITY

## 2022-12-28 RX ORDER — ONDANSETRON 2 MG/ML
4 INJECTION INTRAMUSCULAR; INTRAVENOUS EVERY 30 MIN PRN
Status: DISCONTINUED | OUTPATIENT
Start: 2022-12-28 | End: 2022-12-28 | Stop reason: HOSPADM

## 2022-12-28 RX ORDER — ACETAMINOPHEN 325 MG/1
975 TABLET ORAL ONCE
Status: COMPLETED | OUTPATIENT
Start: 2022-12-28 | End: 2022-12-28

## 2022-12-28 RX ORDER — FENTANYL CITRATE 50 UG/ML
25 INJECTION, SOLUTION INTRAMUSCULAR; INTRAVENOUS
Status: DISCONTINUED | OUTPATIENT
Start: 2022-12-28 | End: 2022-12-28 | Stop reason: HOSPADM

## 2022-12-28 RX ORDER — SODIUM CHLORIDE, SODIUM LACTATE, POTASSIUM CHLORIDE, CALCIUM CHLORIDE 600; 310; 30; 20 MG/100ML; MG/100ML; MG/100ML; MG/100ML
INJECTION, SOLUTION INTRAVENOUS CONTINUOUS
Status: DISCONTINUED | OUTPATIENT
Start: 2022-12-28 | End: 2022-12-28 | Stop reason: HOSPADM

## 2022-12-28 RX ORDER — PROPOFOL 10 MG/ML
INJECTION, EMULSION INTRAVENOUS PRN
Status: DISCONTINUED | OUTPATIENT
Start: 2022-12-28 | End: 2022-12-28

## 2022-12-28 RX ORDER — LIDOCAINE HYDROCHLORIDE 10 MG/ML
INJECTION, SOLUTION INFILTRATION; PERINEURAL PRN
Status: DISCONTINUED | OUTPATIENT
Start: 2022-12-28 | End: 2022-12-28

## 2022-12-28 RX ORDER — ONDANSETRON 4 MG/1
4 TABLET, ORALLY DISINTEGRATING ORAL EVERY 30 MIN PRN
Status: DISCONTINUED | OUTPATIENT
Start: 2022-12-28 | End: 2022-12-28 | Stop reason: HOSPADM

## 2022-12-28 RX ORDER — MEPERIDINE HYDROCHLORIDE 25 MG/ML
12.5 INJECTION INTRAMUSCULAR; INTRAVENOUS; SUBCUTANEOUS
Status: DISCONTINUED | OUTPATIENT
Start: 2022-12-28 | End: 2022-12-28 | Stop reason: HOSPADM

## 2022-12-28 RX ORDER — TRIAMCINOLONE ACETONIDE 40 MG/ML
INJECTION, SUSPENSION INTRA-ARTICULAR; INTRAMUSCULAR PRN
Status: DISCONTINUED | OUTPATIENT
Start: 2022-12-28 | End: 2022-12-28 | Stop reason: HOSPADM

## 2022-12-28 RX ORDER — HYDROMORPHONE HCL IN WATER/PF 6 MG/30 ML
0.2 PATIENT CONTROLLED ANALGESIA SYRINGE INTRAVENOUS EVERY 5 MIN PRN
Status: DISCONTINUED | OUTPATIENT
Start: 2022-12-28 | End: 2022-12-28 | Stop reason: HOSPADM

## 2022-12-28 RX ORDER — FENTANYL CITRATE 50 UG/ML
INJECTION, SOLUTION INTRAMUSCULAR; INTRAVENOUS PRN
Status: DISCONTINUED | OUTPATIENT
Start: 2022-12-28 | End: 2022-12-28

## 2022-12-28 RX ORDER — LIDOCAINE 40 MG/G
CREAM TOPICAL
Status: DISCONTINUED | OUTPATIENT
Start: 2022-12-28 | End: 2022-12-28 | Stop reason: HOSPADM

## 2022-12-28 RX ORDER — HYDROCODONE BITARTRATE AND ACETAMINOPHEN 5; 325 MG/1; MG/1
1 TABLET ORAL EVERY 4 HOURS PRN
Qty: 20 TABLET | Refills: 0 | Status: SHIPPED | OUTPATIENT
Start: 2022-12-28 | End: 2022-12-31

## 2022-12-28 RX ORDER — BUPIVACAINE HYDROCHLORIDE AND EPINEPHRINE 5; 5 MG/ML; UG/ML
INJECTION, SOLUTION PERINEURAL PRN
Status: DISCONTINUED | OUTPATIENT
Start: 2022-12-28 | End: 2022-12-28 | Stop reason: HOSPADM

## 2022-12-28 RX ORDER — HALOPERIDOL 5 MG/ML
1 INJECTION INTRAMUSCULAR
Status: DISCONTINUED | OUTPATIENT
Start: 2022-12-28 | End: 2022-12-28 | Stop reason: HOSPADM

## 2022-12-28 RX ORDER — FENTANYL CITRATE 50 UG/ML
50 INJECTION, SOLUTION INTRAMUSCULAR; INTRAVENOUS EVERY 5 MIN PRN
Status: DISCONTINUED | OUTPATIENT
Start: 2022-12-28 | End: 2022-12-28 | Stop reason: HOSPADM

## 2022-12-28 RX ORDER — OXYCODONE HYDROCHLORIDE 5 MG/1
5 TABLET ORAL EVERY 4 HOURS PRN
Status: DISCONTINUED | OUTPATIENT
Start: 2022-12-28 | End: 2022-12-28 | Stop reason: HOSPADM

## 2022-12-28 RX ORDER — FENTANYL CITRATE 50 UG/ML
25 INJECTION, SOLUTION INTRAMUSCULAR; INTRAVENOUS EVERY 5 MIN PRN
Status: DISCONTINUED | OUTPATIENT
Start: 2022-12-28 | End: 2022-12-28 | Stop reason: HOSPADM

## 2022-12-28 RX ADMIN — LIDOCAINE HYDROCHLORIDE 2 ML: 10 INJECTION, SOLUTION INFILTRATION; PERINEURAL at 12:03

## 2022-12-28 RX ADMIN — SODIUM CHLORIDE, POTASSIUM CHLORIDE, SODIUM LACTATE AND CALCIUM CHLORIDE: 600; 310; 30; 20 INJECTION, SOLUTION INTRAVENOUS at 11:02

## 2022-12-28 RX ADMIN — ACETAMINOPHEN 975 MG: 325 TABLET ORAL at 11:02

## 2022-12-28 RX ADMIN — PROPOFOL 110 MG: 10 INJECTION, EMULSION INTRAVENOUS at 12:03

## 2022-12-28 RX ADMIN — FENTANYL CITRATE 50 MCG: 50 INJECTION, SOLUTION INTRAMUSCULAR; INTRAVENOUS at 12:03

## 2022-12-28 ASSESSMENT — ACTIVITIES OF DAILY LIVING (ADL)
ADLS_ACUITY_SCORE: 35
ADLS_ACUITY_SCORE: 36

## 2022-12-28 NOTE — ANESTHESIA CARE TRANSFER NOTE
Patient: Caitlin Mohan    Procedure: Procedure(s):  RIGHT KNEE MANIPULATION UNDER ANESTHESIA       Diagnosis: Arthrofibrosis of knee joint, right [M24.661]  Diagnosis Additional Information: No value filed.    Anesthesia Type:   General     Note:    Oropharynx: oropharynx clear of all foreign objects  Level of Consciousness: awake  Oxygen Supplementation: room air    Independent Airway: airway patency satisfactory and stable  Dentition: dentition unchanged  Vital Signs Stable: post-procedure vital signs reviewed and stable  Report to RN Given: handoff report given  Patient transferred to: PACU    Handoff Report: Identifed the Patient, Identified the Reponsible Provider, Reviewed the pertinent medical history, Discussed the surgical course, Reviewed Intra-OP anesthesia mangement and issues during anesthesia, Set expectations for post-procedure period and Allowed opportunity for questions and acknowledgement of understanding      Vitals:  Vitals Value Taken Time   BP 89/51 12/28/22 1213   Temp     Pulse 77 12/28/22 1215   Resp 16    SpO2 99 % 12/28/22 1215   Vitals shown include unvalidated device data.    Electronically Signed By: JOSE Gomez CRNA  December 28, 2022  12:16 PM

## 2022-12-28 NOTE — OP NOTE
DATE OF SERVICE: 12/28/2022     PREOPERATIVE DIAGNOSIS: Right arthrofibrosis of knee joint, right [M24.661]    POSTOPERATIVE DIAGNOSIS: Right  Arthrofibrosis of knee joint, right [M24.661].     OPERATION: Right knee manipulation under anesthesia.     ANESTHESIA: MAC .     PREP: Routine.     SURGEON: Eduard El MD.         Indications: Caitlin is a 72-year-old woman who about 6 weeks ago I did a knee arthroplasty on.  She continues have limited range of motion despite physical therapy.  We talked about his treatment options.  This patient is leaving town for the winter.  Our plan is manipulation under anesthesia.    Patient was brought to the operating room and placed supine.  She was given a anesthetic per the anesthetic team.  Once that was adequate we went ahead and did a timeout which involved her right knee.  The right knee was then evaluated range of motion was -3 to approximately 80 degrees.  After gentle manipulation I was able to get 0 to 135 degrees.  There were no complications identified.  Her patella was very mobile and it was no other concerns ligaments.  The knee was then sterilely prepped and injection with Marcaine with epinephrine and Kenalog.  The patient will be discharged home.  Her plan is to leave tomorrow which I think is fine of asked him to continue with her aspirin therapy until they get down to their winter home.  She was also asked to call next week and to assess her improvement.  They are going to start formal therapy down in their winter home.    No complication identified, blood loss was none and no specimens were taken.      Eduard El MD

## 2022-12-28 NOTE — ANESTHESIA PREPROCEDURE EVALUATION
Anesthesia Pre-Procedure Evaluation    Patient: Caitlin Mohan   MRN: [id number] : 1950        Procedure : Procedure(s):  RIGHT KNEE MANIPULATION UNDER ANESTHESIA          Past Medical History:   Diagnosis Date     Breast cyst 1978     H/O kidney donation 1996    LEFT     History of basal cell cancer     NARES     Osteopenia     -1.1     Postmenopausal     AGE 51      Past Surgical History:   Procedure Laterality Date     APPENDECTOMY       ARTHROSCOPIC REPAIR ACL       ARTHROSCOPY KNEE       ARTHROSCOPY KNEE       CATARACT EXTRACTION, BILATERAL       LAPAROSCOPIC DONOR NEPHRECTOMY      Description: Pancreatic Transplantation Donor Pancreatectomy;  Recorded: 2008;      Allergies   Allergen Reactions     Ampicillin Hives     Chlorpheniramine-Pseudoeph [Wal-Finate-D]      Codeine Hives      Social History     Tobacco Use     Smoking status: Never     Smokeless tobacco: Never   Substance Use Topics     Alcohol use: Yes     Comment: Alcoholic Drinks/day: BEER      Wt Readings from Last 1 Encounters:   22 58 kg (127 lb 14.4 oz)        Anesthesia Evaluation   Pt has had prior anesthetic.     No history of anesthetic complications       ROS/MED HX  ENT/Pulmonary:  - neg pulmonary ROS     Neurologic:  - neg neurologic ROS     Cardiovascular:     (+) Dyslipidemia -----    METS/Exercise Tolerance:     Hematologic:  - neg hematologic  ROS     Musculoskeletal:  - neg musculoskeletal ROS     GI/Hepatic:  - neg GI/hepatic ROS     Renal/Genitourinary: Comment: Solitary kidney s/p kidney donation     (+) renal disease, type: CRI,     Endo:  - neg endo ROS     Psychiatric/Substance Use:  - neg psychiatric ROS     Infectious Disease:  - neg infectious disease ROS     Malignancy:  - neg malignancy ROS     Other:  - neg other ROS          Physical Exam    Airway  airway exam normal      Mallampati: I   TM distance: > 3 FB   Neck ROM: full   Mouth opening: > 3 cm    Respiratory Devices and  Support         Dental  no notable dental history         Cardiovascular   cardiovascular exam normal       Rhythm and rate: regular and normal     Pulmonary   pulmonary exam normal        breath sounds clear to auscultation           OUTSIDE LABS:  CBC:   Lab Results   Component Value Date    WBC 5.1 09/16/2020    HGB 14.6 09/16/2020    HCT 44.3 09/16/2020     09/16/2020     BMP:   Lab Results   Component Value Date     09/16/2020    POTASSIUM 4.5 09/16/2020    CHLORIDE 108 (H) 09/16/2020    CO2 27 09/16/2020    BUN 17 09/16/2020    CR 1.03 09/16/2020    GLC 93 09/16/2020     COAGS: No results found for: PTT, INR, FIBR  POC: No results found for: BGM, HCG, HCGS  HEPATIC:   Lab Results   Component Value Date    ALBUMIN 4.4 09/16/2020    PROTTOTAL 7.3 09/16/2020    ALT 22 09/16/2020    AST 23 09/16/2020    ALKPHOS 67 09/16/2020    BILITOTAL 0.7 09/16/2020     OTHER:   Lab Results   Component Value Date    A1C 5.7 (H) 09/16/2020    MAGDALENA 10.4 09/16/2020    TSH 2.24 09/16/2020       Anesthesia Plan    ASA Status:  1   NPO Status:  NPO Appropriate    Anesthesia Type: General.     - Airway: LMA   Induction: Intravenous, Propofol.   Maintenance: Balanced.        Consents    Anesthesia Plan(s) and associated risks, benefits, and realistic alternatives discussed. Questions answered and patient/representative(s) expressed understanding.    - Discussed:     - Discussed with:  Patient         Postoperative Care    Pain management: IV analgesics, Oral pain medications, Multi-modal analgesia.   PONV prophylaxis: Ondansetron (or other 5HT-3), Dexamethasone or Solumedrol, Droperidol or Haldol     Comments:                JOSE ALEXANDRA MD

## 2022-12-28 NOTE — H&P
[facility] History and Physical    Caitlin Mohan MRN# [id number]   Age: 72 year old YOB: 1950     Date of Admission:  12/28/2022      Primary care provider: Juan Khanna          Assessment and Plan:   Assessment:   Arthrofibrosis status post knee arthroplasty right      Plan:   Plan today is a manipulation under anesthesia.  We did talk about the risks and benefits in clinic.  She was happy this treatment plan and all questions answered.  We will do this under MAC type anesthesia today.               Chief Complaint:   Right knee pain     History is obtained from the patient         History of Present Illness:   This patient is a 72 year old female who presents with the following condition requiring a hospital admission:  Caitlin is a healthy active 72-year-old woman who I replaced her right knee approximately 6 weeks ago.  She continues to have limited range of motion.  She is leaving town this weekend for a prolonged winter trip.  We did talk about the risks and benefits.  Our plan at this point time is manipulation of her right knee prior to her leaving town.    Please see previous H&P if there are questions.          Past Medical History:   I have reviewed this patient's past medical history         Past Surgical History:   I have reviewed this patient's past surgical history         Social History:   This patient has no significant social history         Family History:   I have reviewed this patient's family history         Immunizations:   Immunizations are up to date         Allergies:   All allergies reviewed and addressed         Medications:     No current facility-administered medications for this encounter.     Current Outpatient Medications   Medication Sig     aspirin 81 MG EC tablet [ASPIRIN 81 MG EC TABLET] Take 81 mg by mouth daily.     rosuvastatin (CRESTOR) 10 MG tablet Take 10 mg by mouth daily     calcium carbonate (OS-MAGDALENA) 600 mg (1,500 mg) tablet  [CALCIUM CARBONATE (OS-MAGDALENA) 600 MG (1,500 MG) TABLET] Take 600 mg by mouth daily.     cholecalciferol, vitamin D3, 50 mcg (2,000 unit) capsule [CHOLECALCIFEROL, VITAMIN D3, 50 MCG (2,000 UNIT) CAPSULE] Take 2,000 Units by mouth daily.      loratadine (CLARITIN) 10 mg tablet [LORATADINE (CLARITIN) 10 MG TABLET] Take 10 mg by mouth as needed for allergies.      lysine 500 mg Tab [LYSINE 500 MG TAB] Take 500 mg by mouth daily.     MEDICATION CANNOT BE REORDERED - PLEASE MANUALLY REORDER AND DISCONTINUE THE OLD ORDER [TURMERIC-HERBAL COMPLEX NO.278 ORAL] Take 1 tablet by mouth daily.     melatonin 10 mg cap [MELATONIN 10 MG CAP] Take 1 capsule by mouth at bedtime as needed (sleep).     omega 3-dha-epa-fish oil 500-1,000 mg cap [OMEGA 3-DHA-EPA-FISH -1,000 MG CAP] Take 1,000 mg by mouth.             Review of Systems:   CONSTITUTIONAL: NEGATIVE for fever, chills, change in weight  ENT/MOUTH: NEGATIVE for ear, mouth and throat problems  RESP: NEGATIVE for significant cough or SOB  CV: NEGATIVE for chest pain, palpitations or peripheral edema  CV: NEGATIVE for chest pain, palpitations or peripheral edema         Physical Exam:   Vitals were reviewed  All vitals have been reviewed  Musculoskeletal:  Right knee showed incision nicely healed mild swelling warmth no erythema    Lungs clear to auscultation  Heart evaluation was normal as well.         Data:   All laboratory data reviewed  All imaging studies reviewed by me.     Attestation:  I have reviewed today's vital signs, notes, medications, labs and imaging.    Eduard El MD

## 2022-12-28 NOTE — PHARMACY-ADMISSION MEDICATION HISTORY
Pharmacist completed medication history with the patient while in the [facility].  Prior to admission (PTA) med list completed and updated in the electronic medical record (EMR).  Pharmacy Note - Admission Medication History  Pertinent Provider Information: none   ______________________________________________________________________  Prior To Admission (PTA) med list completed and updated in EMR.     Medications Prior to Admission   Medication Sig Dispense Refill Last Dose     calcium carbonate (OS-MAGDALENA) 600 mg (1,500 mg) tablet [CALCIUM CARBONATE (OS-MAGDALENA) 600 MG (1,500 MG) TABLET] Take 600 mg by mouth daily.   12/26/2022     cholecalciferol, vitamin D3, 50 mcg (2,000 unit) capsule [CHOLECALCIFEROL, VITAMIN D3, 50 MCG (2,000 UNIT) CAPSULE] Take 2,000 Units by mouth daily.    12/26/2022     loratadine (CLARITIN) 10 mg tablet Take 10 mg by mouth daily as needed   12/21/2022     lysine 500 mg Tab [LYSINE 500 MG TAB] Take 500 mg by mouth daily.   12/26/2022     omega 3-dha-epa-fish oil 500-1,000 mg cap Take 1,000 mg by mouth daily   12/26/2022     rosuvastatin (CRESTOR) 10 MG tablet Take 10 mg by mouth At Bedtime   12/26/2022     vitamin E 400 units TABS Take 400 Units by mouth daily   12/26/2022     [DISCONTINUED] melatonin 10 mg cap [MELATONIN 10 MG CAP] Take 1 capsule by mouth at bedtime as needed (sleep).            Information source(s): Patient and CareEverywhere/SureScripts  Patient was asked about OTC/herbal products specifically.  PTA med list reflects this.  Based on the pharmacist s assessment, the PTA med list information appears reliable  Allergies were reviewed, assessed, and updated with the patient.    Medications available for use during hospital stay: none  Thank you for the opportunity to participate in the care of this patient.    The patient was asked about OTC/herbal products specifically and the PTA med list reflects the patient's response.    Allergies were reviewed and assessed with the patient,  responses were updated in the EMR.    Thank you for the opportunity to participate in the care of this patient.    Barak Ahn [facility] 12/28/2022 11:31 AM

## 2022-12-28 NOTE — ANESTHESIA POSTPROCEDURE EVALUATION
Patient: Caitlin Mohan    Procedure: Procedure(s):  RIGHT KNEE MANIPULATION UNDER ANESTHESIA       Anesthesia Type:  General    Note:  Disposition: Outpatient   Postop Pain Control: Uneventful            Sign Out: Well controlled pain   PONV: No   Neuro/Psych: Uneventful            Sign Out: Acceptable/Baseline neuro status   Airway/Respiratory: Uneventful            Sign Out: Acceptable/Baseline resp. status   CV/Hemodynamics: Uneventful            Sign Out: Acceptable CV status; No obvious hypovolemia; No obvious fluid overload   Other NRE: NONE   DID A NON-ROUTINE EVENT OCCUR? No           Last vitals:  Vitals Value Taken Time   /61 12/28/22 1240   Temp 37  C (98.6  F) 12/28/22 1213   Pulse 73 12/28/22 1244   Resp     SpO2 99 % 12/28/22 1244   Vitals shown include unvalidated device data.    Electronically Signed By: JOSE ALEXANDRA MD  December 28, 2022  1:00 PM

## 2023-03-31 ENCOUNTER — PATIENT OUTREACH (OUTPATIENT)
Dept: INTERNAL MEDICINE | Facility: CLINIC | Age: 73
End: 2023-03-31
Payer: MEDICARE

## 2023-03-31 NOTE — TELEPHONE ENCOUNTER
Patient Quality Outreach    Patient is due for the following:   Physical Annual Wellness Visit      Topic Date Due     Flu Vaccine (1) 09/01/2022     Zoster (Shingles) Vaccine (3 of 3) 04/04/2022     Diptheria Tetanus Pertussis (DTAP/TDAP/TD) Vaccine (2 - Td or Tdap) 04/12/2023       Next Steps:   Schedule a Annual Wellness Visit  Patient was assigned appropriate questionnaire to complete    Type of outreach:    Sent [employer] message.      Questions for provider review:    None     Pao Headley MA

## 2025-07-07 ENCOUNTER — PATIENT OUTREACH (OUTPATIENT)
Dept: CARE COORDINATION | Facility: CLINIC | Age: 75
End: 2025-07-07
Payer: MEDICARE

## (undated) DEVICE — NEEDLE HYPO 18X1-1/2 SAFETY 305918

## (undated) DEVICE — SYR 20ML LL W/O NDL 302830

## (undated) RX ORDER — LIDOCAINE HYDROCHLORIDE 10 MG/ML
INJECTION, SOLUTION EPIDURAL; INFILTRATION; INTRACAUDAL; PERINEURAL
Status: DISPENSED
Start: 2022-12-28

## (undated) RX ORDER — FENTANYL CITRATE 50 UG/ML
INJECTION, SOLUTION INTRAMUSCULAR; INTRAVENOUS
Status: DISPENSED
Start: 2022-12-28

## (undated) RX ORDER — PROPOFOL 10 MG/ML
INJECTION, EMULSION INTRAVENOUS
Status: DISPENSED
Start: 2022-12-28